# Patient Record
Sex: FEMALE | Race: WHITE | NOT HISPANIC OR LATINO
[De-identification: names, ages, dates, MRNs, and addresses within clinical notes are randomized per-mention and may not be internally consistent; named-entity substitution may affect disease eponyms.]

---

## 2020-12-03 ENCOUNTER — TRANSCRIPTION ENCOUNTER (OUTPATIENT)
Age: 25
End: 2020-12-03

## 2021-03-30 PROBLEM — Z00.00 ENCOUNTER FOR PREVENTIVE HEALTH EXAMINATION: Status: ACTIVE | Noted: 2021-03-30

## 2021-04-07 ENCOUNTER — APPOINTMENT (OUTPATIENT)
Dept: CARDIOLOGY | Facility: CLINIC | Age: 26
End: 2021-04-07
Payer: COMMERCIAL

## 2021-04-07 ENCOUNTER — NON-APPOINTMENT (OUTPATIENT)
Age: 26
End: 2021-04-07

## 2021-04-07 VITALS
TEMPERATURE: 97.6 F | WEIGHT: 198 LBS | HEART RATE: 82 BPM | OXYGEN SATURATION: 98 % | RESPIRATION RATE: 18 BRPM | DIASTOLIC BLOOD PRESSURE: 80 MMHG | HEIGHT: 62 IN | SYSTOLIC BLOOD PRESSURE: 118 MMHG | BODY MASS INDEX: 36.44 KG/M2

## 2021-04-07 DIAGNOSIS — Z78.9 OTHER SPECIFIED HEALTH STATUS: ICD-10-CM

## 2021-04-07 DIAGNOSIS — Z86.69 PERSONAL HISTORY OF OTHER DISEASES OF THE NERVOUS SYSTEM AND SENSE ORGANS: ICD-10-CM

## 2021-04-07 DIAGNOSIS — G40.909 EPILEPSY, UNSPECIFIED, NOT INTRACTABLE, W/OUT STATUS EPILEPTICUS: ICD-10-CM

## 2021-04-07 PROCEDURE — 99205 OFFICE O/P NEW HI 60 MIN: CPT

## 2021-04-07 PROCEDURE — 99072 ADDL SUPL MATRL&STAF TM PHE: CPT

## 2021-04-07 PROCEDURE — 93000 ELECTROCARDIOGRAM COMPLETE: CPT

## 2021-04-07 RX ORDER — NORETHINDRONE 0.35 MG/1
0.35 TABLET ORAL DAILY
Refills: 0 | Status: ACTIVE | COMMUNITY

## 2021-04-07 NOTE — PHYSICAL EXAM
[General Appearance - Well Developed] : well developed [Normal Appearance] : normal appearance [Well Groomed] : well groomed [General Appearance - Well Nourished] : well nourished [No Deformities] : no deformities [General Appearance - In No Acute Distress] : no acute distress [Normal Conjunctiva] : the conjunctiva exhibited no abnormalities [Eyelids - No Xanthelasma] : the eyelids demonstrated no xanthelasmas [Normal Oral Mucosa] : normal oral mucosa [No Oral Pallor] : no oral pallor [No Oral Cyanosis] : no oral cyanosis [Normal Jugular Venous V Waves Present] : normal jugular venous V waves present [No Jugular Venous Us A Waves] : no jugular venous us A waves [Heart Rate And Rhythm] : heart rate and rhythm were normal [Heart Sounds] : normal S1 and S2 [Murmurs] : no murmurs present [Respiration, Rhythm And Depth] : normal respiratory rhythm and effort [Exaggerated Use Of Accessory Muscles For Inspiration] : no accessory muscle use [Auscultation Breath Sounds / Voice Sounds] : lungs were clear to auscultation bilaterally [Bowel Sounds] : normal bowel sounds [Abdomen Soft] : soft [Abdomen Tenderness] : non-tender [Abnormal Walk] : normal gait [Gait - Sufficient For Exercise Testing] : the gait was sufficient for exercise testing [Nail Clubbing] : no clubbing of the fingernails [Cyanosis, Localized] : no localized cyanosis [Petechial Hemorrhages (___cm)] : no petechial hemorrhages [Skin Color & Pigmentation] : normal skin color and pigmentation [] : no rash [No Venous Stasis] : no venous stasis [Skin Lesions] : no skin lesions [No Skin Ulcers] : no skin ulcer [No Xanthoma] : no  xanthoma was observed [Oriented To Time, Place, And Person] : oriented to person, place, and time [Affect] : the affect was normal [Mood] : the mood was normal [No Anxiety] : not feeling anxious

## 2021-04-07 NOTE — ASSESSMENT
[FreeTextEntry1] : \par \par 25-year-old  female with history of obesity here after a hospitalization for new onset atrial fibrillation with echocardiogram showing left ventricular hypertrophy without prior hypertension.\par \par \par # New onset parox AF now NSR: CHADSVASC 1 for female\par - will likely convert from eliquis 5 BID to aspirin 81 mg daily for CVA ppx at 3 week follow up visit\par -On amiodarone 200 mg daily, will likely stop at 3-week follow-up visit\par - will repeat TTE now she is in NSR, if LVH confirmed, I will consider CMR\par - repeat labwork ordered now on amiodarone (do in 2-3 weeks preclinic)\par - ordered home sleep study\par - diet/lifestyle optimization, weight loss\par -Minimizing caffeine intake\par -CV risks factor lab work ordered and will calculate ASCVD based on this\par \par \par

## 2021-04-07 NOTE — HISTORY OF PRESENT ILLNESS
[FreeTextEntry1] : \par 25-year-old  female with history of obesity here after a hospitalization for new onset atrial fibrillation.  Converted with amiodarone.  Been in sinus rhythm since.  Never happened before.  Felt palpitations this occurred March 25.  Has been compliant with Eliquis and amiodarone on discharge.  No further palpitations.  Of note she did have 3 espressos daily routinely.  Since discharge she has not had further caffeine. \par \par \par \par TTE 3/25/2021: Reports increased left ventricular wall thickness with LVH.  Biventricular function preserved.  LA size upper limit of normal.\par Lab work 3/25/2021: Normal TFT, normal CBC, normal CMP.

## 2021-04-12 ENCOUNTER — TRANSCRIPTION ENCOUNTER (OUTPATIENT)
Age: 26
End: 2021-04-12

## 2021-04-22 ENCOUNTER — NON-APPOINTMENT (OUTPATIENT)
Age: 26
End: 2021-04-22

## 2021-04-26 ENCOUNTER — OUTPATIENT (OUTPATIENT)
Dept: OUTPATIENT SERVICES | Facility: HOSPITAL | Age: 26
LOS: 1 days | End: 2021-04-26
Payer: COMMERCIAL

## 2021-04-26 DIAGNOSIS — G47.33 OBSTRUCTIVE SLEEP APNEA (ADULT) (PEDIATRIC): ICD-10-CM

## 2021-04-26 PROCEDURE — 95806 SLEEP STUDY UNATT&RESP EFFT: CPT

## 2021-04-26 PROCEDURE — 95806 SLEEP STUDY UNATT&RESP EFFT: CPT | Mod: 26

## 2021-05-10 ENCOUNTER — TRANSCRIPTION ENCOUNTER (OUTPATIENT)
Age: 26
End: 2021-05-10

## 2021-05-12 ENCOUNTER — APPOINTMENT (OUTPATIENT)
Dept: CARDIOLOGY | Facility: CLINIC | Age: 26
End: 2021-05-12
Payer: COMMERCIAL

## 2021-05-12 VITALS
SYSTOLIC BLOOD PRESSURE: 122 MMHG | WEIGHT: 180 LBS | OXYGEN SATURATION: 99 % | HEIGHT: 62 IN | BODY MASS INDEX: 33.13 KG/M2 | DIASTOLIC BLOOD PRESSURE: 68 MMHG | HEART RATE: 72 BPM

## 2021-05-12 DIAGNOSIS — G43.909 MIGRAINE, UNSPECIFIED, NOT INTRACTABLE, W/OUT STATUS MIGRAINOSUS: ICD-10-CM

## 2021-05-12 PROCEDURE — 99214 OFFICE O/P EST MOD 30 MIN: CPT

## 2021-05-12 PROCEDURE — 93306 TTE W/DOPPLER COMPLETE: CPT

## 2021-05-12 PROCEDURE — 99072 ADDL SUPL MATRL&STAF TM PHE: CPT

## 2021-05-12 RX ORDER — AMIODARONE HYDROCHLORIDE 200 MG/1
200 TABLET ORAL DAILY
Qty: 30 | Refills: 0 | Status: DISCONTINUED | COMMUNITY
Start: 1900-01-01 | End: 2021-05-12

## 2021-05-12 NOTE — ASSESSMENT
[FreeTextEntry1] : \par \par 25-year-old  female with hx of newly diagnosed AF 3/25/21 (eliquis), obesity (no CHASTITY) here for follow up after testing. TTE had severe LAE, preserved biv function, mild/mod MR and inferior/inferoseptal wall hypokinesis with normal wall thickness. Testing detailed above. CV labwork well contorlled LDL and A1c.\par \par \par # Will maintain contraceptive use. Understands risks of pregnancy and current medications/testing.\par \par # New onset parox AF now NSR: CHADSVASC 1 for female:\par - with severe LAE, possible patient had AF in past as well.\par - continue eliquis 5 mg BID for now, continue discussing transition to aspirin 81 mg daily for CVA ppx\par - decrease amio to 100 daily for 2 weeks then stop it. when decrease amio then will start toprol 25\par - I have ordered a cardiac MRI to accurately assess for MR/RWMA/chamber sizes/thicknesses (discrepant)\par - diet/lifestyle optimization, weight loss\par -Minimizing caffeine intake\par \par Return after CMR. ER precautions given to patient.\par

## 2021-05-12 NOTE — PHYSICAL EXAM
[General Appearance - Well Developed] : well developed [Normal Appearance] : normal appearance [Well Groomed] : well groomed [General Appearance - Well Nourished] : well nourished [No Deformities] : no deformities [General Appearance - In No Acute Distress] : no acute distress [Normal Conjunctiva] : the conjunctiva exhibited no abnormalities [Eyelids - No Xanthelasma] : the eyelids demonstrated no xanthelasmas [Normal Oral Mucosa] : normal oral mucosa [No Oral Pallor] : no oral pallor [No Oral Cyanosis] : no oral cyanosis [Normal Jugular Venous V Waves Present] : normal jugular venous V waves present [No Jugular Venous Us A Waves] : no jugular venous us A waves [Heart Rate And Rhythm] : heart rate and rhythm were normal [Heart Sounds] : normal S1 and S2 [Respiration, Rhythm And Depth] : normal respiratory rhythm and effort [Exaggerated Use Of Accessory Muscles For Inspiration] : no accessory muscle use [Auscultation Breath Sounds / Voice Sounds] : lungs were clear to auscultation bilaterally [Bowel Sounds] : normal bowel sounds [Abdomen Soft] : soft [Abdomen Tenderness] : non-tender [Abnormal Walk] : normal gait [Gait - Sufficient For Exercise Testing] : the gait was sufficient for exercise testing [Nail Clubbing] : no clubbing of the fingernails [Cyanosis, Localized] : no localized cyanosis [Petechial Hemorrhages (___cm)] : no petechial hemorrhages [Skin Color & Pigmentation] : normal skin color and pigmentation [] : no rash [No Venous Stasis] : no venous stasis [Skin Lesions] : no skin lesions [No Skin Ulcers] : no skin ulcer [No Xanthoma] : no  xanthoma was observed [Oriented To Time, Place, And Person] : oriented to person, place, and time [Affect] : the affect was normal [Mood] : the mood was normal [No Anxiety] : not feeling anxious [Edema] : no peripheral edema present [FreeTextEntry1] : 2/6 diastolic murmur MR

## 2021-05-12 NOTE — HISTORY OF PRESENT ILLNESS
[FreeTextEntry1] : \par 25-year-old  female with hx of newly diagnosed AF 3/25/21 (eliquis), obesity (no CHASTITY) here for follow up after testing. She was plan to wean off amiodarone and potentially d/c eliquis if no recurrence in future.\par \par TTE today had severe LAE, preserved biv function, mild/mod MR and inferior/inferoseptal wall hypokinesis with normal wall thickness. Asymptomatic. Compliant with medications. No symptomatic recurrence, avoiding caffeine (prior had 3 espressos daily). Losing weight.\par \par \par INTERIM TESTING:\par In interim:\par - sleep study no CHASTITY\par TTE 5/12/21:\par lab 4/22/21: CBC, Cmp grossly normal. trig 21, HDL 64, ldl 44, tchol 112, a1c 5.2, normal ferritin\par \par Prior testing:\par TTE 3/25/2021: Reports increased left ventricular wall thickness with LVH.  Biventricular function preserved.  LA size upper limit of normal.\par Lab work 3/25/2021: Normal TFT, normal CBC, normal CMP.

## 2021-08-03 RX ORDER — AMIODARONE HYDROCHLORIDE 100 MG/1
100 TABLET ORAL DAILY
Qty: 60 | Refills: 2 | Status: DISCONTINUED | COMMUNITY
Start: 2021-05-12 | End: 2021-08-03

## 2021-08-31 ENCOUNTER — OUTPATIENT (OUTPATIENT)
Dept: OUTPATIENT SERVICES | Facility: HOSPITAL | Age: 26
LOS: 1 days | End: 2021-08-31
Payer: COMMERCIAL

## 2021-08-31 ENCOUNTER — RESULT REVIEW (OUTPATIENT)
Age: 26
End: 2021-08-31

## 2021-08-31 ENCOUNTER — APPOINTMENT (OUTPATIENT)
Dept: MRI IMAGING | Facility: CLINIC | Age: 26
End: 2021-08-31
Payer: COMMERCIAL

## 2021-08-31 DIAGNOSIS — I48.0 PAROXYSMAL ATRIAL FIBRILLATION: ICD-10-CM

## 2021-08-31 DIAGNOSIS — R93.1 ABNORMAL FINDINGS ON DIAGNOSTIC IMAGING OF HEART AND CORONARY CIRCULATION: ICD-10-CM

## 2021-08-31 DIAGNOSIS — I34.0 NONRHEUMATIC MITRAL (VALVE) INSUFFICIENCY: ICD-10-CM

## 2021-08-31 PROCEDURE — 75561 CARDIAC MRI FOR MORPH W/DYE: CPT

## 2021-08-31 PROCEDURE — 75565 CARD MRI VELOC FLOW MAPPING: CPT

## 2021-08-31 PROCEDURE — 75561 CARDIAC MRI FOR MORPH W/DYE: CPT | Mod: 26

## 2021-08-31 PROCEDURE — 75565 CARD MRI VELOC FLOW MAPPING: CPT | Mod: 26

## 2021-08-31 PROCEDURE — A9585: CPT

## 2021-09-22 ENCOUNTER — APPOINTMENT (OUTPATIENT)
Dept: CARDIOLOGY | Facility: CLINIC | Age: 26
End: 2021-09-22
Payer: COMMERCIAL

## 2021-09-22 VITALS
OXYGEN SATURATION: 100 % | WEIGHT: 180 LBS | TEMPERATURE: 98.2 F | BODY MASS INDEX: 33.13 KG/M2 | HEIGHT: 62 IN | DIASTOLIC BLOOD PRESSURE: 76 MMHG | HEART RATE: 81 BPM | SYSTOLIC BLOOD PRESSURE: 116 MMHG

## 2021-09-22 DIAGNOSIS — R93.1 ABNORMAL FINDINGS ON DIAGNOSTIC IMAGING OF HEART AND CORONARY CIRCULATION: ICD-10-CM

## 2021-09-22 PROCEDURE — 99214 OFFICE O/P EST MOD 30 MIN: CPT

## 2021-09-22 PROCEDURE — 93000 ELECTROCARDIOGRAM COMPLETE: CPT

## 2021-09-22 RX ORDER — APIXABAN 5 MG/1
5 TABLET, FILM COATED ORAL
Qty: 180 | Refills: 3 | Status: DISCONTINUED | COMMUNITY
Start: 1900-01-01 | End: 2021-09-22

## 2021-09-22 RX ORDER — METOPROLOL SUCCINATE 25 MG/1
25 TABLET, EXTENDED RELEASE ORAL DAILY
Qty: 90 | Refills: 3 | Status: DISCONTINUED | COMMUNITY
Start: 2021-05-12 | End: 2021-09-22

## 2021-09-22 NOTE — DISCUSSION/SUMMARY
[FreeTextEntry1] : \par 26-year-old  female with hx of newly diagnosed AF 3/25/21 (eliquis now down to asa 81), obesity (no CHASTITY) here for follow up after testing. Asymptomatic.\par Cardiac MRI 8/31/21 remarkable for: no rwma, has bowing of inferior wall slight asymmetric and anteroseptal of mid unclear etiology not causing rwma. no LGE, no scar. mild MR. mild LAE. no cardiomyopathy. CV labwork well contorlled LDL and A1c.\par \par \par \par # Will maintain contraceptive use. Understands risks of pregnancy and current medications/testing.\par \par # New onset parox AF now NSR: CHADSVASC 1 for female: mild LAE\par - transition to aspirin 81 mg daily for CVA ppx\par - weaned off amio, can come off BB, restart if recurrence/symptoms\par - diet/lifestyle optimization, weight loss\par -Minimizing caffeine intake\par \par \par Return in 1 year. call if issue in interim. ER precautions given to patient.\par

## 2021-09-22 NOTE — HISTORY OF PRESENT ILLNESS
[FreeTextEntry1] : \par 26-year-old  female with hx of newly diagnosed AF 3/25/21 (ASA), obesity (no CHASTITY) here for follow up after testing.\par \par She underwent cardiac mri due to abnormalities seen on TTE detailed below. . Asymptomatic. Compliant with medications. No symptomatic recurrence, avoiding caffeine (prior had 3 espressos daily). Losing weight. In past: weaned off amio.\par \par Cardiac MRI 8/31/21 remarkable for: no rwma, has bowing of inferior wall slight asymmetric and anteroseptal of mid unclear etiology not causing rwma. no LGE, no scar. mild MR. mild LAE. no cardiomyopathy.\par \par asx from cv standpoint.\par \par \par TESTING:\par Cardiac MRI 8/31/21: no rwma, has bowing of inferior wall slight asymmetric and anteroseptal of mid unclear etiology not causing rwma. no LGE, no scar. mild MR. mild LAE. no cardiomyopathy.\par \par TTE 5/12/21: severe LAE, preserved biv function, mild/mod MR and inferior/inferoseptal wall hypokinesis with normal wall thickness. \par \par lab 4/22/21: CBC, Cmp grossly normal. trig 21, HDL 64, ldl 44, tchol 112, a1c 5.2, normal ferritin\par \par - sleep study no CHASTITY\par \par TTE 3/25/2021: Reports increased left ventricular wall thickness with LVH.  Biventricular function preserved.  LA size upper limit of normal.\par Lab work 3/25/2021: Normal TFT, normal CBC, normal CMP.

## 2021-09-22 NOTE — PHYSICAL EXAM
[General Appearance - Well Developed] : well developed [Normal Appearance] : normal appearance [Well Groomed] : well groomed [General Appearance - Well Nourished] : well nourished [No Deformities] : no deformities [General Appearance - In No Acute Distress] : no acute distress [Normal Conjunctiva] : the conjunctiva exhibited no abnormalities [Eyelids - No Xanthelasma] : the eyelids demonstrated no xanthelasmas [Normal Oral Mucosa] : normal oral mucosa [No Oral Pallor] : no oral pallor [No Oral Cyanosis] : no oral cyanosis [Normal Jugular Venous V Waves Present] : normal jugular venous V waves present [No Jugular Venous Us A Waves] : no jugular venous us A waves [Heart Rate And Rhythm] : heart rate and rhythm were normal [Heart Sounds] : normal S1 and S2 [Edema] : no peripheral edema present [Respiration, Rhythm And Depth] : normal respiratory rhythm and effort [Auscultation Breath Sounds / Voice Sounds] : lungs were clear to auscultation bilaterally [Exaggerated Use Of Accessory Muscles For Inspiration] : no accessory muscle use [Bowel Sounds] : normal bowel sounds [Abdomen Soft] : soft [Abdomen Tenderness] : non-tender [Abnormal Walk] : normal gait [Gait - Sufficient For Exercise Testing] : the gait was sufficient for exercise testing [Nail Clubbing] : no clubbing of the fingernails [Cyanosis, Localized] : no localized cyanosis [Petechial Hemorrhages (___cm)] : no petechial hemorrhages [Skin Color & Pigmentation] : normal skin color and pigmentation [] : no rash [No Venous Stasis] : no venous stasis [Skin Lesions] : no skin lesions [No Skin Ulcers] : no skin ulcer [No Xanthoma] : no  xanthoma was observed [Oriented To Time, Place, And Person] : oriented to person, place, and time [Affect] : the affect was normal [Mood] : the mood was normal [No Anxiety] : not feeling anxious [FreeTextEntry1] : 2/6 diastolic murmur MR

## 2022-11-01 ENCOUNTER — NON-APPOINTMENT (OUTPATIENT)
Age: 27
End: 2022-11-01

## 2022-11-07 ENCOUNTER — APPOINTMENT (OUTPATIENT)
Dept: CARDIOLOGY | Facility: CLINIC | Age: 27
End: 2022-11-07
Payer: COMMERCIAL

## 2022-11-07 VITALS
SYSTOLIC BLOOD PRESSURE: 118 MMHG | OXYGEN SATURATION: 99 % | TEMPERATURE: 97.1 F | HEART RATE: 79 BPM | BODY MASS INDEX: 34.04 KG/M2 | DIASTOLIC BLOOD PRESSURE: 80 MMHG | WEIGHT: 185 LBS | HEIGHT: 62 IN

## 2022-11-07 DIAGNOSIS — I48.0 PAROXYSMAL ATRIAL FIBRILLATION: ICD-10-CM

## 2022-11-07 DIAGNOSIS — I34.0 NONRHEUMATIC MITRAL (VALVE) INSUFFICIENCY: ICD-10-CM

## 2022-11-07 DIAGNOSIS — E66.9 OBESITY, UNSPECIFIED: ICD-10-CM

## 2022-11-07 PROCEDURE — 99213 OFFICE O/P EST LOW 20 MIN: CPT

## 2022-11-07 RX ORDER — ASPIRIN 325 MG
81 TABLET ORAL DAILY
Qty: 90 | Refills: 0 | Status: ACTIVE | COMMUNITY
Start: 2021-09-22

## 2022-11-07 NOTE — DISCUSSION/SUMMARY
[FreeTextEntry1] : \par 27-year-old  female with hx of diagnosed AF 3/25/21 (ASA), obesity (no CHASTITY). \par \par # Pre operative cv evaluation for fibroid removal: METS>4. No recent CV hospitalizations. No recent ACS, decompensated heart failure, preclusive arrhythmias.\par - No further cardiac testing required prior to procedure. Continue cardiovascular medications as tolerated branden-procedurally except CAN HOLD aspirin for 7 days prior and restart 24 hours after hemostasis at discretion of proceduralist. Patient is not at an elevated risk for AMI or CHF bradnen-procedurally.\par \par # Will maintain contraceptive use. Understands risks of pregnancy and current medications/testing.\par \par # New onset parox AF 3/2021 now NSR: CHADSVASC 1 for female: mild LAE\par - transitioned to aspirin 81 mg daily for CVA ppx\par - weaned off amio and BB, can restart bb if recurrence/symptoms\par - diet/lifestyle optimization, weight loss\par -Minimizing caffeine intake\par - echo in 1 year for LA\par \par \par Return in 1 year with tte same day. call if issue in interim. ER precautions given to patient.\par

## 2022-11-07 NOTE — HISTORY OF PRESENT ILLNESS
[FreeTextEntry1] : \par 27-year-old  female with hx of diagnosed AF 3/25/21 (ASA), obesity (no CHASTITY)\par \par 11/2022: METS>4. No recent CV hospitalizations. No recent ACS, decompensated heart failure, preclusive arrhythmias. no bleeding. compliant. + abd pain. pending fibroid removal. \par \par 9/2021: She underwent cardiac mri due to abnormalities seen on TTE detailed below. . Asymptomatic. Compliant with medications. No symptomatic recurrence, avoiding caffeine (prior had 3 espressos daily). Losing weight. In past: weaned off amio.\par \par TESTING:\par Cardiac MRI 8/31/21: no rwma, has bowing of inferior wall slight asymmetric and anteroseptal of mid unclear etiology not causing rwma. no LGE, no scar. mild MR. mild LAE. no cardiomyopathy.\par \par TTE 5/12/21: severe LAE, preserved biv function, mild/mod MR and inferior/inferoseptal wall hypokinesis with normal wall thickness. \par \par lab 4/22/21: CBC, Cmp grossly normal. trig 21, HDL 64, ldl 44, tchol 112, a1c 5.2, normal ferritin\par \par - sleep study no CHASTITY\par \par TTE 3/25/2021: Reports increased left ventricular wall thickness with LVH.  Biventricular function preserved.  LA size upper limit of normal.\par Lab work 3/25/2021: Normal TFT, normal CBC, normal CMP.\par

## 2022-11-07 NOTE — PHYSICAL EXAM
[General Appearance - Well Developed] : well developed [Normal Appearance] : normal appearance [Well Groomed] : well groomed [General Appearance - Well Nourished] : well nourished [No Deformities] : no deformities [General Appearance - In No Acute Distress] : no acute distress [Normal Conjunctiva] : the conjunctiva exhibited no abnormalities [Eyelids - No Xanthelasma] : the eyelids demonstrated no xanthelasmas [Normal Oral Mucosa] : normal oral mucosa [No Oral Pallor] : no oral pallor [No Oral Cyanosis] : no oral cyanosis [Normal Jugular Venous V Waves Present] : normal jugular venous V waves present [No Jugular Venous Us A Waves] : no jugular venous us A waves [Heart Rate And Rhythm] : heart rate and rhythm were normal [Heart Sounds] : normal S1 and S2 [Edema] : no peripheral edema present [Respiration, Rhythm And Depth] : normal respiratory rhythm and effort [Exaggerated Use Of Accessory Muscles For Inspiration] : no accessory muscle use [Auscultation Breath Sounds / Voice Sounds] : lungs were clear to auscultation bilaterally [Bowel Sounds] : normal bowel sounds [Abdomen Soft] : soft [Abdomen Tenderness] : non-tender [Abnormal Walk] : normal gait [Gait - Sufficient For Exercise Testing] : the gait was sufficient for exercise testing [Nail Clubbing] : no clubbing of the fingernails [Cyanosis, Localized] : no localized cyanosis [Petechial Hemorrhages (___cm)] : no petechial hemorrhages [Skin Color & Pigmentation] : normal skin color and pigmentation [] : no rash [No Venous Stasis] : no venous stasis [Skin Lesions] : no skin lesions [No Skin Ulcers] : no skin ulcer [No Xanthoma] : no  xanthoma was observed [Oriented To Time, Place, And Person] : oriented to person, place, and time [Affect] : the affect was normal [Mood] : the mood was normal [No Anxiety] : not feeling anxious [FreeTextEntry1] : 2/6 diastolic murmur MR

## 2023-11-13 ENCOUNTER — APPOINTMENT (OUTPATIENT)
Dept: CARDIOLOGY | Facility: CLINIC | Age: 28
End: 2023-11-13

## 2024-08-29 ENCOUNTER — APPOINTMENT (OUTPATIENT)
Dept: ORTHOPEDIC SURGERY | Facility: CLINIC | Age: 29
End: 2024-08-29
Payer: COMMERCIAL

## 2024-08-29 DIAGNOSIS — M84.375A STRESS FRACTURE, LEFT FOOT, INITIAL ENCOUNTER FOR FRACTURE: ICD-10-CM

## 2024-08-29 DIAGNOSIS — Z86.79 PERSONAL HISTORY OF OTHER DISEASES OF THE CIRCULATORY SYSTEM: ICD-10-CM

## 2024-08-29 PROCEDURE — 73630 X-RAY EXAM OF FOOT: CPT | Mod: LT

## 2024-08-29 PROCEDURE — 99203 OFFICE O/P NEW LOW 30 MIN: CPT

## 2024-08-29 NOTE — DISCUSSION/SUMMARY
[de-identified] : I reviewed all diagnostic and physical findings, the anatomy and pathology were discussed and the patient understands. All questions were answered and the patient left pleased. After discussion of diagnosis and treatment options, this patient has elected to treat non-operatively with exercises, medications, physical therapy and activity modification.  f/u podiatry for orthotic f/u after ct

## 2024-08-29 NOTE — PHYSICAL EXAM
[NL (40)] : plantar flexion 40 degrees [NL 30)] : inversion 30 degrees [NL (20)] : eversion 20 degrees [5___] : eversion 5[unfilled]/5 [1+] : posterior tibialis pulse: 1+ [Left] : left foot [Weight -] : weightbearing [There are no fractures, subluxations or dislocations. No significant abnormalities are seen] : There are no fractures, subluxations or dislocations. No significant abnormalities are seen [Degenerative change] : Degenerative change [] : no difficulty with single heel rise [FreeTextEntry8] : mid foot ache [de-identified] : flat foot

## 2024-08-29 NOTE — HISTORY OF PRESENT ILLNESS
[de-identified] : Patient presents for LT foot pain evaluation. Patient states for a couple of weeks she has been having LT foot pain and radiates to the top of her foot and ankle. Patient states this happens when she weightbears. Patient denies NSAIDs.

## 2024-09-17 ENCOUNTER — APPOINTMENT (OUTPATIENT)
Dept: ORTHOPEDIC SURGERY | Facility: CLINIC | Age: 29
End: 2024-09-17
Payer: COMMERCIAL

## 2024-09-17 DIAGNOSIS — M25.872 OTHER SPECIFIED JOINT DISORDERS, LEFT ANKLE AND FOOT: ICD-10-CM

## 2024-09-17 DIAGNOSIS — M84.375A STRESS FRACTURE, LEFT FOOT, INITIAL ENCOUNTER FOR FRACTURE: ICD-10-CM

## 2024-09-17 PROCEDURE — 99213 OFFICE O/P EST LOW 20 MIN: CPT

## 2024-09-17 NOTE — DATA REVIEWED
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 Nonspecific mild subcutaneous fat stranding a long 3 planter aspect of  the foot possibly representing edema

## 2024-09-17 NOTE — DISCUSSION/SUMMARY
[de-identified] : I reviewed all diagnostic and physical findings, the anatomy and pathology were discussed and the patient understands. All questions were answered and the patient left pleased. After discussion of diagnosis and treatment options, this patient has elected to treat non-operatively with exercises, medications, physical therapy and activity modification. refer to podiatry for orthotics

## 2024-09-17 NOTE — PHYSICAL EXAM
[NL (40)] : plantar flexion 40 degrees [NL 30)] : inversion 30 degrees [NL (20)] : eversion 20 degrees [5___] : eversion 5[unfilled]/5 [1+] : posterior tibialis pulse: 1+ [Left] : left foot [Weight -] : weightbearing [There are no fractures, subluxations or dislocations. No significant abnormalities are seen] : There are no fractures, subluxations or dislocations. No significant abnormalities are seen [Degenerative change] : Degenerative change [] : no difficulty with single heel rise [FreeTextEntry8] : mid foot ache [de-identified] : flat foot

## 2024-09-25 ENCOUNTER — APPOINTMENT (OUTPATIENT)
Dept: MRI IMAGING | Facility: CLINIC | Age: 29
End: 2024-09-25

## 2024-09-25 ENCOUNTER — RESULT REVIEW (OUTPATIENT)
Age: 29
End: 2024-09-25

## 2024-09-25 PROCEDURE — 73718 MRI LOWER EXTREMITY W/O DYE: CPT | Mod: LT

## 2024-10-01 ENCOUNTER — APPOINTMENT (OUTPATIENT)
Dept: ORTHOPEDIC SURGERY | Facility: CLINIC | Age: 29
End: 2024-10-01
Payer: COMMERCIAL

## 2024-10-01 DIAGNOSIS — M19.071 PRIMARY OSTEOARTHRITIS, RIGHT ANKLE AND FOOT: ICD-10-CM

## 2024-10-01 DIAGNOSIS — M77.8 OTHER ENTHESOPATHIES, NOT ELSEWHERE CLASSIFIED: ICD-10-CM

## 2024-10-01 DIAGNOSIS — M67.471 GANGLION, RIGHT ANKLE AND FOOT: ICD-10-CM

## 2024-10-01 PROCEDURE — 99213 OFFICE O/P EST LOW 20 MIN: CPT

## 2024-10-01 PROCEDURE — L4361: CPT | Mod: RT

## 2024-10-01 NOTE — DISCUSSION/SUMMARY
[de-identified] : I reviewed all diagnostic and physical findings, the anatomy and pathology were discussed and the patient understands. All questions were answered and the patient left pleased. After discussion of diagnosis and treatment options, this patient has elected to treat non-operatively with exercises, medications, physical therapy and activity modification. recommend orthotics, placed in to cam walker

## 2024-10-01 NOTE — PHYSICAL EXAM
[NL (40)] : plantar flexion 40 degrees [NL 30)] : inversion 30 degrees [NL (20)] : eversion 20 degrees [5___] : eversion 5[unfilled]/5 [1+] : posterior tibialis pulse: 1+ [Left] : left foot [Mild] : mild swelling of dorsal foot [] : no difficulty with single heel rise [FreeTextEntry8] : mid foot ache, tender dorsal

## 2024-10-01 NOTE — DATA REVIEWED
[MRI] : MRI [Left] : left [Foot] : foot [Report was reviewed and noted in the chart] : The report was reviewed and noted in the chart [I reviewed the films/CD and agree] : I reviewed the films/CD and agree [FreeTextEntry1] : No stress fx, hallux rigidus. Distal forefoot areas of muscle fatty atrophy with edema. Ganglion cyst at the dorsal of the navicular measuring 1.7 x 1.3 x 0.5 cm

## 2025-05-06 ENCOUNTER — NON-APPOINTMENT (OUTPATIENT)
Age: 30
End: 2025-05-06

## 2025-05-06 ENCOUNTER — APPOINTMENT (OUTPATIENT)
Dept: CARDIOLOGY | Facility: CLINIC | Age: 30
End: 2025-05-06
Payer: COMMERCIAL

## 2025-05-06 VITALS
HEIGHT: 62 IN | DIASTOLIC BLOOD PRESSURE: 64 MMHG | WEIGHT: 222 LBS | OXYGEN SATURATION: 95 % | BODY MASS INDEX: 40.85 KG/M2 | HEART RATE: 83 BPM | SYSTOLIC BLOOD PRESSURE: 118 MMHG

## 2025-05-06 DIAGNOSIS — I48.0 PAROXYSMAL ATRIAL FIBRILLATION: ICD-10-CM

## 2025-05-06 DIAGNOSIS — E66.9 OBESITY, UNSPECIFIED: ICD-10-CM

## 2025-05-06 DIAGNOSIS — R00.0 TACHYCARDIA, UNSPECIFIED: ICD-10-CM

## 2025-05-06 DIAGNOSIS — I34.0 NONRHEUMATIC MITRAL (VALVE) INSUFFICIENCY: ICD-10-CM

## 2025-05-06 PROCEDURE — 99204 OFFICE O/P NEW MOD 45 MIN: CPT

## 2025-05-06 PROCEDURE — 93000 ELECTROCARDIOGRAM COMPLETE: CPT

## 2025-05-06 PROCEDURE — 93246 EXT ECG>7D<15D RECORDING: CPT | Mod: 1L

## 2025-05-27 PROCEDURE — 93248 EXT ECG>7D<15D REV&INTERPJ: CPT

## 2025-06-23 ENCOUNTER — APPOINTMENT (OUTPATIENT)
Dept: CARDIOLOGY | Facility: CLINIC | Age: 30
End: 2025-06-23
Payer: COMMERCIAL

## 2025-06-23 PROCEDURE — 93306 TTE W/DOPPLER COMPLETE: CPT

## 2025-06-30 ENCOUNTER — APPOINTMENT (OUTPATIENT)
Dept: CARDIOLOGY | Facility: CLINIC | Age: 30
End: 2025-06-30
Payer: COMMERCIAL

## 2025-06-30 VITALS
BODY MASS INDEX: 37.91 KG/M2 | HEIGHT: 62 IN | SYSTOLIC BLOOD PRESSURE: 124 MMHG | HEART RATE: 74 BPM | OXYGEN SATURATION: 100 % | DIASTOLIC BLOOD PRESSURE: 82 MMHG | WEIGHT: 206 LBS

## 2025-06-30 PROCEDURE — 99214 OFFICE O/P EST MOD 30 MIN: CPT

## 2025-07-15 LAB — HBA1C MFR BLD HPLC: 5.3
